# Patient Record
Sex: FEMALE
[De-identification: names, ages, dates, MRNs, and addresses within clinical notes are randomized per-mention and may not be internally consistent; named-entity substitution may affect disease eponyms.]

---

## 2018-02-15 ENCOUNTER — HOSPITAL ENCOUNTER (OUTPATIENT)
Dept: HOSPITAL 5 - SPVIMAG | Age: 83
Discharge: HOME | End: 2018-02-15
Attending: ORTHOPAEDIC SURGERY
Payer: MEDICARE

## 2018-02-15 DIAGNOSIS — M17.11: Primary | ICD-10-CM

## 2018-02-15 DIAGNOSIS — M25.861: ICD-10-CM

## 2018-02-15 DIAGNOSIS — M85.861: ICD-10-CM

## 2018-02-15 NOTE — XRAY REPORT
XRAY RIGHT KNEE 4 THREE VIEWS: 02/15/18





CLINICAL: Right knee pain.



FINDINGS: Mild osteopenia.  Lateral joint space narrowing with small 

osteophytes.  The medial joint space is normal with a small inferior 

osteophyte.  Normal patellofemoral joint.  No joint effusion.Vascular 

calcifications.



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 

osteoarthritis of the lateral joint space.